# Patient Record
Sex: MALE | Race: BLACK OR AFRICAN AMERICAN | Employment: UNEMPLOYED | URBAN - METROPOLITAN AREA
[De-identification: names, ages, dates, MRNs, and addresses within clinical notes are randomized per-mention and may not be internally consistent; named-entity substitution may affect disease eponyms.]

---

## 2018-07-22 ENCOUNTER — HOSPITAL ENCOUNTER (EMERGENCY)
Age: 23
Discharge: HOME | End: 2018-07-22
Attending: EMERGENCY MEDICINE
Payer: SELF-PAY

## 2018-07-22 VITALS
OXYGEN SATURATION: 100 % | DIASTOLIC BLOOD PRESSURE: 62 MMHG | SYSTOLIC BLOOD PRESSURE: 130 MMHG | TEMPERATURE: 98.1 F | BODY MASS INDEX: 23.98 KG/M2 | RESPIRATION RATE: 18 BRPM | HEART RATE: 59 BPM | HEIGHT: 72 IN | WEIGHT: 177.03 LBS

## 2018-07-22 DIAGNOSIS — S00.452A FOREIGN BODY IN EAR LOBE, LEFT, INITIAL ENCOUNTER: Primary | ICD-10-CM

## 2018-07-22 PROCEDURE — 99283 EMERGENCY DEPT VISIT LOW MDM: CPT

## 2018-07-22 PROCEDURE — 74011250637 HC RX REV CODE- 250/637: Performed by: PHYSICIAN ASSISTANT

## 2018-07-22 RX ORDER — NAPROXEN 500 MG/1
500 TABLET ORAL
Qty: 20 TAB | Refills: 0 | Status: SHIPPED | OUTPATIENT
Start: 2018-07-22 | End: 2019-12-25

## 2018-07-22 RX ORDER — CEPHALEXIN 500 MG/1
500 CAPSULE ORAL 3 TIMES DAILY
Qty: 15 CAP | Refills: 0 | Status: SHIPPED | OUTPATIENT
Start: 2018-07-22 | End: 2018-07-22

## 2018-07-22 RX ORDER — CEPHALEXIN 500 MG/1
500 CAPSULE ORAL 3 TIMES DAILY
Qty: 15 CAP | Refills: 0 | Status: SHIPPED | OUTPATIENT
Start: 2018-07-22 | End: 2019-12-25

## 2018-07-22 RX ORDER — CEPHALEXIN 250 MG/1
500 CAPSULE ORAL
Status: COMPLETED | OUTPATIENT
Start: 2018-07-22 | End: 2018-07-22

## 2018-07-22 RX ORDER — NAPROXEN 500 MG/1
500 TABLET ORAL
Qty: 20 TAB | Refills: 0 | Status: SHIPPED | OUTPATIENT
Start: 2018-07-22 | End: 2018-07-22

## 2018-07-22 RX ADMIN — CEPHALEXIN 500 MG: 250 CAPSULE ORAL at 20:09

## 2018-07-22 RX ADMIN — BACITRACIN ZINC, NEOMYCIN SULFATE, POLYMYXIN B SULFATE 1 PACKET: 3.5; 5000; 4 OINTMENT TOPICAL at 20:09

## 2018-07-23 NOTE — ED NOTES
Pt received discharge instructions from [address] [place] [address] and verbalized understanding. Pt ambulatory to exit with a steady gait.

## 2018-07-23 NOTE — ED PROVIDER NOTES
EMERGENCY DEPARTMENT HISTORY AND PHYSICAL EXAM 
     
 
Date: 7/22/2018 Patient Name: Fe Garcia History of Presenting Illness Chief Complaint Patient presents with  Foreign Body in Ear  
  c/o ear ring stuck in left lobe x 4 days History Provided By: Patient HPI: Fe Garcia is a 25 y.o. male, who presents ambulatory to the ED c/o earring and backing stuck in left ear lobe. It has been stuck 3-4 days. It is only painful when he tries to remove the earring. He specifically denies any recent fevers, chills,  or headache. He denies history of diabetes, kidney disease, liver disease, thyroid disease PCP: None There are no other complaints, changes, or physical findings at this time. Current Facility-Administered Medications Medication Dose Route Frequency Provider Last Rate Last Dose  cephALEXin (KEFLEX) capsule 500 mg  500 mg Oral NOW TERE Guillen      
 neomycin-bacitracnZn-polymyxnB (NEOSPORIN) ointment 1 Packet  1 Packet Topical NOW TERE Guillen Current Outpatient Prescriptions Medication Sig Dispense Refill  cephALEXin (KEFLEX) 500 mg capsule Take 1 Cap by mouth three (3) times daily. 15 Cap 0  
 naproxen (NAPROSYN) 500 mg tablet Take 1 Tab by mouth every twelve (12) hours as needed for Pain. 20 Tab 0  
 neomycin-bacitracin-polymyxin (NEOSPORIN, [id number],) 3.5mg-400 unit- 5,000 unit/gram ointment Apply  to affected area three (3) times daily for 10 days. Apply to affected area 1 Tube 0 Past History Past Medical History: 
History reviewed. No pertinent past medical history. Past Surgical History: 
History reviewed. No pertinent surgical history. Family History: 
History reviewed. No pertinent family history. Social History: 
Social History Substance Use Topics  Smoking status: Never Smoker  Smokeless tobacco: Never Used  Alcohol use No  
 
 
Allergies: 
No Known Allergies Review of Systems Review of Systems Constitutional: Negative for activity change, appetite change, chills, fatigue and fever. HENT: Positive for ear pain. Negative for congestion, dental problem, rhinorrhea and sore throat. Eyes: Negative for photophobia, pain, discharge, redness, itching and visual disturbance. Respiratory: Negative for cough, chest tightness, shortness of breath and wheezing. Cardiovascular: Negative for chest pain and leg swelling. Gastrointestinal: Negative for abdominal distention, abdominal pain, blood in stool, constipation, diarrhea, nausea and vomiting. Genitourinary: Negative for discharge, dysuria, flank pain, hematuria and penile pain. Musculoskeletal: Negative for arthralgias, back pain, gait problem, joint swelling and myalgias. Skin: Positive for wound. Negative for color change and rash. Neurological: Negative for dizziness, syncope, weakness, numbness and headaches. Psychiatric/Behavioral: Negative for confusion and decreased concentration. The patient is not nervous/anxious. Physical Exam  
Physical Exam  
Constitutional: He is oriented to person, place, and time. He appears well-developed and well-nourished. No distress. HENT:  
Head: Normocephalic and atraumatic. Right Ear: External ear normal.  
Left Ear: External ear normal.  
Nose: Nose normal.  
Mouth/Throat: Oropharynx is clear and moist. No oropharyngeal exudate. [place] stud earring with clear crystal in center in left ear lobe. Swelling to the ear lobe. Small amount of blood and pus coming from piercing. No visible back of ear ring. TTP. Eyes: Conjunctivae and EOM are normal. Pupils are equal, round, and reactive to light. Right eye exhibits no discharge. Left eye exhibits no discharge. No scleral icterus. Neck: Normal range of motion. Neck supple. No tracheal deviation present. Cardiovascular: Normal rate, regular rhythm, normal heart sounds and intact distal pulses.   Exam reveals no gallop and no friction rub. No murmur heard. Pulmonary/Chest: Effort normal and breath sounds normal. No respiratory distress. He has no wheezes. He has no rales. He exhibits no tenderness. Musculoskeletal: He exhibits no edema or tenderness. Lymphadenopathy:  
  He has no cervical adenopathy. Neurological: He is alert and oriented to person, place, and time. No cranial nerve deficit. Coordination normal.  
Skin: Skin is warm and dry. No rash noted. No erythema. See ear exam  
Psychiatric: He has a normal mood and affect. His behavior is normal. Judgment and thought content normal.  
Nursing note and vitals reviewed. Diagnostic Study Results Labs - No results found for this or any previous visit (from the past 12 hour(s)). Radiologic Studies - No orders to display CT Results  (Last 48 hours) None CXR Results  (Last 48 hours) None Medical Decision Making I am the first provider for this patient. I reviewed the vital signs, available nursing notes, past medical history, past surgical history, family history and social history. Vital Signs-Reviewed the patient's vital signs. Patient Vitals for the past 12 hrs: 
 Temp Pulse Resp BP SpO2  
07/22/18 1914 98.1 °F (36.7 °C) (!) 59 18 130/62 100 % Records Reviewed: Nursing Notes Provider Notes (Medical Decision Making): DDx: foreign body ear lobe, abscess, cellulitis. ED Course:  
Initial assessment performed. The patients presenting problems have been discussed, and they are in agreement with the care plan formulated and outlined with them. I have encouraged them to ask questions as they arise throughout their visit. Procedure Note - Foreign Body Cavity: 
8:00PM 
Performed by: TERE Bishop Foreign body was seen in the earlobe. Foreign body removed with hemostat without difficulty. Earring and clear plastic backing came through the anterior aspect of the earlobe.  With small amount of pus. Estimated blood loss: 1mL The procedure took 1-15 minutes, and pt tolerated well. PROGRESS NOTE: 
8:10 Pt noted to be feeling better , ready for discharge. Will follow up as instructed. All questions have been answered, pt voiced understanding and agreement with plan. Abx were prescribed, pt advised that diarrhea and rash are possible side effects of the medications. Specific return precautions provided as well as instructions to return to the ED should sx worsen at any time. Vital signs stable for discharge. GENO Kelly Disposition: 
 
DISCHARGE NOTE: 
8:15 PM 
The patient's results have been reviewed with family and/or caregiver. They verbally convey their understanding and agreement of the patient's signs, symptoms, diagnosis, treatment, and prognosis. They additionally agree to follow up as recommended in the discharge instructions or to return to the Emergency Room should the patient's condition change prior to their follow-up appointment. The family and/or caregiver verbally agrees with the care-plan and all of their questions have been answered. The discharge instructions have also been provided to the them along with educational information regarding the patient's diagnosis and a list of reasons why the patient would want to return to the ER prior to their follow-up appointment should their condition change. GENO Kelly PLAN: 
1. Current Discharge Medication List  
  
START taking these medications Details  
cephALEXin (KEFLEX) 500 mg capsule Take 1 Cap by mouth three (3) times daily. Qty: 15 Cap, Refills: 0  
  
naproxen (NAPROSYN) 500 mg tablet Take 1 Tab by mouth every twelve (12) hours as needed for Pain. Qty: 20 Tab, Refills: 0  
  
neomycin-bacitracin-polymyxin (NEOSPORIN, [id number],) 3.5mg-400 unit- 5,000 unit/gram ointment Apply  to affected area three (3) times daily for 10 days. Apply to affected area Qty: 1 Tube, Refills: 0  
  
  
 
2. Follow-up Information Follow up With Details Comments Contact Info [facility] EMERGENCY DEPT  If symptoms worsen [address] [facility] [address] [facility] 
[phone] Return to ED if worse Diagnosis Clinical Impression: 1. Foreign body in ear lobe, left, initial encounter Not viewable in MyChart

## 2019-12-25 ENCOUNTER — HOSPITAL ENCOUNTER (EMERGENCY)
Age: 24
Discharge: HOME | End: 2019-12-25
Attending: EMERGENCY MEDICINE
Payer: COMMERCIAL

## 2019-12-25 VITALS
BODY MASS INDEX: 26.43 KG/M2 | WEIGHT: 195.11 LBS | SYSTOLIC BLOOD PRESSURE: 122 MMHG | HEIGHT: 72 IN | TEMPERATURE: 99.1 F | RESPIRATION RATE: 14 BRPM | HEART RATE: 80 BPM | DIASTOLIC BLOOD PRESSURE: 72 MMHG | OXYGEN SATURATION: 100 %

## 2019-12-25 DIAGNOSIS — B34.9 VIRAL ILLNESS: Primary | ICD-10-CM

## 2019-12-25 PROCEDURE — 99282 EMERGENCY DEPT VISIT SF MDM: CPT

## 2019-12-25 NOTE — ED TRIAGE NOTES
Pt arrives ambulatory to ed with temperature of 100 PTA. Pt took cold and sinus medication around 0100.  No other complaints    99.1 upon arrival.

## 2019-12-25 NOTE — ED PROVIDER NOTES
Healthy. He presents accompanied by family member with complaints of feeling like he is coming down with a fever. Symptoms began yesterday. He has felt warm. He Took his temperature and it was 100. He has had mild cough and congestion. He denies chest pain, dyspnea, nausea, vomiting, diarrhea. His appetite is been slightly decreased. 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 Symptoms mild. He has taken some over-the-counter cough and cold medication. No past medical history on file. No past surgical history on file. No family history on file.     Social History     Socioeconomic History    Marital status: SINGLE     Spouse name: Not on file    Number of children: Not on file    Years of education: Not on file    Highest education level: Not on file   Occupational History    Not on file   Social Needs    Financial resource strain: Not on file    Food insecurity:     Worry: Not on file     Inability: Not on file    Transportation needs:     Medical: Not on file     Non-medical: Not on file   Tobacco Use    Smoking status: Never Smoker    Smokeless tobacco: Never Used   Substance and Sexual Activity    Alcohol use: No    Drug use: No    Sexual activity: Not on file   Lifestyle    Physical activity:     Days per week: Not on file     Minutes per session: Not on file    Stress: Not on file   Relationships    Social connections:     Talks on phone: Not on file     Gets together: Not on file     Attends Gnosticism service: Not on file     Active member of club or organization: Not on file     Attends meetings of clubs or organizations: Not on file     Relationship status: Not on file    Intimate partner violence:     Fear of current or ex partner: Not on file     Emotionally abused: Not on file     Physically abused: Not on file     Forced sexual activity: Not on file   Other Topics Concern    Not on file   Social History Narrative    Not on file         ALLERGIES: Patient has no known allergies. Review of Systems   All other systems reviewed and are negative. Vitals:    12/25/19 0259   BP: 122/72   Pulse: 80   Resp: 14   Temp: 99.1 °F (37.3 °C)   SpO2: 100%   Weight: 88.5 kg (195 lb 1.7 oz)   Height: 6' (1.829 m)            Physical Exam  Vitals signs and nursing note reviewed. Constitutional:       Appearance: He is well-developed. HENT:      Head: Normocephalic and atraumatic. Eyes:      Conjunctiva/sclera: Conjunctivae normal.   Neck:      Musculoskeletal: Neck supple. Trachea: No tracheal deviation. Cardiovascular:      Rate and Rhythm: Normal rate and regular rhythm. Heart sounds: Normal heart sounds. No murmur. No friction rub. No gallop. Pulmonary:      Effort: Pulmonary effort is normal.      Breath sounds: Normal breath sounds. Abdominal:      Palpations: Abdomen is soft. Tenderness: There is no tenderness. Musculoskeletal:         General: No deformity. Skin:     General: Skin is warm and dry. Neurological:      Mental Status: He is alert. Comments: oriented          MDM       Procedures    Assessment/plan: Possibly coming down with a viral illness. I have recommended Tylenol or ibuprofen for fever. He is well-appearing in no distress.   Herbert Oliva MD  3:05 AM

## 2019-12-25 NOTE — DISCHARGE INSTRUCTIONS
Patient Education        Viral Infections: Care Instructions  Your Care Instructions    You don't feel well, but it's not clear what's causing it. You may have a viral infection. Viruses cause many illnesses, such as the common cold, influenza, fever, rashes, and the diarrhea, nausea, and vomiting that are often called \"stomach flu. \" You may wonder if antibiotic medicines could make you feel better. But antibiotics only treat infections caused by bacteria. They don't work on viruses. The good news is that viral infections usually aren't serious. Most will go away in a few days without medical treatment. In the meantime, there are a few things you can do to make yourself more comfortable. Follow-up care is a key part of your treatment and safety. Be sure to make and go to all appointments, and call your doctor if you are having problems. It's also a good idea to know your test results and keep a list of the medicines you take. How can you care for yourself at home? · Get plenty of rest if you feel tired. · Take an over-the-counter pain medicine if needed, such as acetaminophen (Tylenol), ibuprofen (Advil, Motrin), or naproxen (Aleve). Read and follow all instructions on the label. · Be careful when taking over-the-counter cold or flu medicines and Tylenol at the same time. Many of these medicines have acetaminophen, which is Tylenol. Read the labels to make sure that you are not taking more than the recommended dose. Too much acetaminophen (Tylenol) can be harmful. · Drink plenty of fluids, enough so that your urine is light yellow or clear like water. If you have kidney, heart, or liver disease and have to limit fluids, talk with your doctor before you increase the amount of fluids you drink. · Stay home from work, school, and other public places while you have a fever. When should you call for help? Call 911 anytime you think you may need emergency care.  For example, call if:    · You have severe trouble breathing.     · You passed out (lost consciousness).    Call your doctor now or seek immediate medical care if:    · You seem to be getting much sicker.     · You have a new or higher fever.     · You have blood in your stools.     · You have new belly pain, or your pain gets worse.     · You have a new rash.    Watch closely for changes in your health, and be sure to contact your doctor if:    · You start to get better and then get worse.     · You do not get better as expected. Where can you learn more? Go to http://celso-collins.info/. Enter K700 in the search box to learn more about \"Viral Infections: Care Instructions. \"  Current as of: June 9, 2019  Content Version: 12.2  © 8209-7924 [employer]. Care instructions adapted under license by [employer] (which disclaims liability or warranty for this information). If you have questions about a medical condition or this instruction, always ask your healthcare professional. [place] [address] any warranty or liability for your use of this information.

## 2019-12-25 NOTE — ED NOTES
I have reviewed discharge instructions with the patient. The patient verbalized understanding. Ambulatory to vehicle. nad.

## 2019-12-25 NOTE — LETTER
[address] 
[facility] 
[address] [id number] [phone] Work/School Note Date: 12/25/2019 To Whom It May concern: 
 
Suly Yung was seen and treated today in the emergency room by the following provider(s): 
Attending Provider: Yossi Hernandez MD. Suly Yung Special Instructions: Please excuse from work today. Sincerely, Wei Pagan MD